# Patient Record
Sex: MALE | Race: WHITE | Employment: FULL TIME | URBAN - METROPOLITAN AREA
[De-identification: names, ages, dates, MRNs, and addresses within clinical notes are randomized per-mention and may not be internally consistent; named-entity substitution may affect disease eponyms.]

---

## 2021-08-20 LAB
A/G RATIO: 1.4 (ref 1.1–2.2)
ALBUMIN SERPL-MCNC: 3.9 G/DL (ref 3.4–5)
ALP BLD-CCNC: 70 U/L (ref 40–129)
ALT SERPL-CCNC: 24 U/L (ref 10–40)
ANION GAP SERPL CALCULATED.3IONS-SCNC: 7 MMOL/L (ref 3–16)
AST SERPL-CCNC: 17 U/L (ref 15–37)
BASOPHILS ABSOLUTE: 0 K/UL (ref 0–0.2)
BASOPHILS RELATIVE PERCENT: 0.5 %
BILIRUB SERPL-MCNC: 0.5 MG/DL (ref 0–1)
BUN BLDV-MCNC: 16 MG/DL (ref 7–20)
CALCIUM SERPL-MCNC: 9.5 MG/DL (ref 8.3–10.6)
CHLORIDE BLD-SCNC: 104 MMOL/L (ref 99–110)
CO2: 28 MMOL/L (ref 21–32)
CREAT SERPL-MCNC: 1.1 MG/DL (ref 0.9–1.3)
EOSINOPHILS ABSOLUTE: 0.2 K/UL (ref 0–0.6)
EOSINOPHILS RELATIVE PERCENT: 2 %
GFR AFRICAN AMERICAN: >60
GFR NON-AFRICAN AMERICAN: >60
GLOBULIN: 2.7 G/DL
GLUCOSE BLD-MCNC: 103 MG/DL (ref 70–99)
HCT VFR BLD CALC: 51.1 % (ref 40.5–52.5)
HEMOGLOBIN: 17 G/DL (ref 13.5–17.5)
LYMPHOCYTES ABSOLUTE: 2.2 K/UL (ref 1–5.1)
LYMPHOCYTES RELATIVE PERCENT: 22.9 %
MCH RBC QN AUTO: 31.6 PG (ref 26–34)
MCHC RBC AUTO-ENTMCNC: 33.3 G/DL (ref 31–36)
MCV RBC AUTO: 95 FL (ref 80–100)
MONOCYTES ABSOLUTE: 0.8 K/UL (ref 0–1.3)
MONOCYTES RELATIVE PERCENT: 8 %
NEUTROPHILS ABSOLUTE: 6.5 K/UL (ref 1.7–7.7)
NEUTROPHILS RELATIVE PERCENT: 66.6 %
PDW BLD-RTO: 15 % (ref 12.4–15.4)
PLATELET # BLD: 215 K/UL (ref 135–450)
PMV BLD AUTO: 8.4 FL (ref 5–10.5)
POTASSIUM SERPL-SCNC: 4.8 MMOL/L (ref 3.5–5.1)
RBC # BLD: 5.39 M/UL (ref 4.2–5.9)
SODIUM BLD-SCNC: 139 MMOL/L (ref 136–145)
TOTAL PROTEIN: 6.6 G/DL (ref 6.4–8.2)
WBC # BLD: 9.8 K/UL (ref 4–11)

## 2021-09-20 ENCOUNTER — ANESTHESIA EVENT (OUTPATIENT)
Dept: ENDOSCOPY | Age: 56
End: 2021-09-20
Payer: COMMERCIAL

## 2021-09-20 RX ORDER — HYDROCHLOROTHIAZIDE 25 MG/1
25 TABLET ORAL EVERY MORNING
Status: ON HOLD | COMMUNITY
Start: 2020-11-25 | End: 2021-09-21

## 2021-09-20 RX ORDER — OMEPRAZOLE 20 MG/1
20 CAPSULE, DELAYED RELEASE ORAL DAILY
COMMUNITY

## 2021-09-20 ASSESSMENT — LIFESTYLE VARIABLES: SMOKING_STATUS: 1

## 2021-09-20 NOTE — ANESTHESIA PRE PROCEDURE
Department of Anesthesiology  Preprocedure Note       Name:  Jaciel Yuen   Age:  50 y.o.  :  1965                                          MRN:  [id number]         Date:  2021      Surgeon: Dewayne Jimenez):  Tiana Glover MD    Procedure: Procedure(s):  ESOPHAGOGASTRODUODENOSCOPY  COLONOSCOPY    Medications prior to admission:   Prior to Admission medications    Medication Sig Start Date End Date Taking? Authorizing Provider   omeprazole (PRILOSEC) 20 MG delayed release capsule Take 20 mg by mouth daily   Yes Historical Provider, MD       Current medications:    Current Facility-Administered Medications   Medication Dose Route Frequency Provider Last Rate Last Admin    0.9 % sodium chloride infusion   IntraVENous Continuous Deejay Montanez MD 75 mL/hr at 21 0758 New Bag at 21 0758    sodium chloride flush 0.9 % injection 5-40 mL  5-40 mL IntraVENous 2 times per day Deejay Montanez MD        sodium chloride flush 0.9 % injection 5-40 mL  5-40 mL IntraVENous PRN Deejay Montanez MD        0.9 % sodium chloride infusion  25 mL IntraVENous PRN Deejay Montanez MD           Allergies:  No Known Allergies    Problem List:  There is no problem list on file for this patient.       Past Medical History:        Diagnosis Date    Blood in stool     Diarrhea     DVT (deep venous thrombosis) (HCC)     Edema leg     bilateral    Fatigue     Hypertension        Past Surgical History:        Procedure Laterality Date    ELBOW SURGERY      right       Social History:    Social History     Tobacco Use    Smoking status: Current Every Day Smoker     Packs/day: 2.00     Years: 25.00     Pack years: 50.00     Types: Cigarettes    Smokeless tobacco: Never Used   Substance Use Topics    Alcohol use: Not Currently                                Ready to quit: Not Answered  Counseling given: Not Answered      Vital Signs (Current):   Vitals:    21 9354 21 3920 21 8608 BP:   (!) 149/89   Pulse:   71   Resp:   18   Temp:   97 °F (36.1 °C)   TempSrc:   Temporal   SpO2:   94%   Weight: (!) 349 lb (158.3 kg) (!) 341 lb 14.9 oz (155.1 kg)    Height: 6' (1.829 m)                                                BP Readings from Last 3 Encounters:   09/21/21 (!) 149/89       NPO Status: Time of last liquid consumption: 0130                        Time of last solid consumption: 1000                        Date of last liquid consumption: 09/21/21                        Date of last solid food consumption: 09/20/21    BMI:   Wt Readings from Last 3 Encounters:   09/21/21 (!) 341 lb 14.9 oz (155.1 kg)     Body mass index is 46.37 kg/m². CBC:   Lab Results   Component Value Date    WBC 9.8 08/20/2021    RBC 5.39 08/20/2021    HGB 17.0 08/20/2021    HCT 51.1 08/20/2021    MCV 95.0 08/20/2021    RDW 15.0 08/20/2021     08/20/2021       CMP:   Lab Results   Component Value Date     08/20/2021    K 4.8 08/20/2021     08/20/2021    CO2 28 08/20/2021    BUN 16 08/20/2021    CREATININE 1.1 08/20/2021    GFRAA >60 08/20/2021    AGRATIO 1.4 08/20/2021    LABGLOM >60 08/20/2021    GLUCOSE 103 08/20/2021    PROT 6.6 08/20/2021    CALCIUM 9.5 08/20/2021    BILITOT 0.5 08/20/2021    ALKPHOS 70 08/20/2021    AST 17 08/20/2021    ALT 24 08/20/2021       POC Tests: No results for input(s): POCGLU, POCNA, POCK, POCCL, POCBUN, POCHEMO, POCHCT in the last 72 hours.     Coags: No results found for: PROTIME, INR, APTT    HCG (If Applicable): No results found for: PREGTESTUR, PREGSERUM, HCG, HCGQUANT     ABGs: No results found for: PHART, PO2ART, CBU9DMZ, MVG8FRG, BEART, T6TBNBPS     Type & Screen (If Applicable):  No results found for: LABABO, LABRH    Drug/Infectious Status (If Applicable):  No results found for: HIV, HEPCAB    COVID-19 Screening (If Applicable): No results found for: COVID19        Anesthesia Evaluation   no history of anesthetic complications:   Airway: Mallampati: II  TM distance: >3 FB   Neck ROM: full  Mouth opening: > = 3 FB Dental:      Comment: No loose teeth    Pulmonary:   (+) decreased breath sounds,  asthma: current smoker    (-) COPD, rhonchi, wheezes and rales  Sleep apnea: patient denies. Cardiovascular:  Exercise tolerance: good (>4 METS),   (+) hypertension (no longer taking HCTZ):,     (-) murmur, weak pulses and no hyperlipidemia      Rhythm: regular  Rate: normal                 ROS comment: Echocardiogram:   Summary:    Overall left ventricular ejection fraction is estimated to be 60-65%.   Left ventricular systolic function is normal. (CRGJ>/=04%)  Normal Diastolic Function.       Neuro/Psych:      (-) seizures, TIA and CVA           GI/Hepatic/Renal:   (+) GERD:, bowel prep, morbid obesity (BMI: 47.3)          Endo/Other:                      ROS comment: + h/o DVT   Abdominal:   (+) obese (morbid),     Abdomen: soft. Vascular: Other Findings:           Anesthesia Plan      MAC     ASA 3     (POM )  Induction: intravenous. Anesthetic plan and risks discussed with spouse and patient. Use of blood products discussed with patient whom. Plan discussed with CRNA. This pre-anesthesia assessment may be used as a history and physical.    DOS STAFF ADDENDUM:    Pt seen and examined, chart reviewed (including anesthesia, drug and allergy history). No interval changes to history and physical examination. Anesthetic plan, risks, benefits, alternatives, and personnel involved discussed with patient. Patient verbalized an understanding and agrees to proceed.       Joelle Abraham MD  September 21, 2021  7:59 AM

## 2021-09-21 ENCOUNTER — ANESTHESIA (OUTPATIENT)
Dept: ENDOSCOPY | Age: 56
End: 2021-09-21
Payer: COMMERCIAL

## 2021-09-21 ENCOUNTER — HOSPITAL ENCOUNTER (OUTPATIENT)
Age: 56
Setting detail: OUTPATIENT SURGERY
Discharge: HOME HEALTH | End: 2021-09-21
Attending: INTERNAL MEDICINE | Admitting: INTERNAL MEDICINE
Payer: COMMERCIAL

## 2021-09-21 VITALS
WEIGHT: 315 LBS | DIASTOLIC BLOOD PRESSURE: 76 MMHG | OXYGEN SATURATION: 90 % | BODY MASS INDEX: 42.66 KG/M2 | SYSTOLIC BLOOD PRESSURE: 124 MMHG | RESPIRATION RATE: 15 BRPM | HEART RATE: 89 BPM | HEIGHT: 72 IN | TEMPERATURE: 97 F

## 2021-09-21 VITALS
SYSTOLIC BLOOD PRESSURE: 221 MMHG | OXYGEN SATURATION: 98 % | RESPIRATION RATE: 17 BRPM | DIASTOLIC BLOOD PRESSURE: 105 MMHG

## 2021-09-21 DIAGNOSIS — R19.7 DIARRHEA, UNSPECIFIED TYPE: ICD-10-CM

## 2021-09-21 LAB — SARS-COV-2, NAAT: NOT DETECTED

## 2021-09-21 PROCEDURE — 2709999900 HC NON-CHARGEABLE SUPPLY: Performed by: INTERNAL MEDICINE

## 2021-09-21 PROCEDURE — 3609010300 HC COLONOSCOPY W/BIOPSY SINGLE/MULTIPLE: Performed by: INTERNAL MEDICINE

## 2021-09-21 PROCEDURE — 7100000010 HC PHASE II RECOVERY - FIRST 15 MIN: Performed by: INTERNAL MEDICINE

## 2021-09-21 PROCEDURE — 94640 AIRWAY INHALATION TREATMENT: CPT

## 2021-09-21 PROCEDURE — 3609012400 HC EGD TRANSORAL BIOPSY SINGLE/MULTIPLE: Performed by: INTERNAL MEDICINE

## 2021-09-21 PROCEDURE — 7100000000 HC PACU RECOVERY - FIRST 15 MIN: Performed by: INTERNAL MEDICINE

## 2021-09-21 PROCEDURE — 2720000010 HC SURG SUPPLY STERILE: Performed by: INTERNAL MEDICINE

## 2021-09-21 PROCEDURE — 3609013200 HC EGD W/ ABLATION: Performed by: INTERNAL MEDICINE

## 2021-09-21 PROCEDURE — 3609010200 HC COLONOSCOPY ABLATION TUMOR POLYP/OTHER LES: Performed by: INTERNAL MEDICINE

## 2021-09-21 PROCEDURE — 6370000000 HC RX 637 (ALT 250 FOR IP): Performed by: STUDENT IN AN ORGANIZED HEALTH CARE EDUCATION/TRAINING PROGRAM

## 2021-09-21 PROCEDURE — 87635 SARS-COV-2 COVID-19 AMP PRB: CPT

## 2021-09-21 PROCEDURE — 2580000003 HC RX 258: Performed by: ANESTHESIOLOGY

## 2021-09-21 PROCEDURE — 94761 N-INVAS EAR/PLS OXIMETRY MLT: CPT

## 2021-09-21 PROCEDURE — 6370000000 HC RX 637 (ALT 250 FOR IP): Performed by: INTERNAL MEDICINE

## 2021-09-21 PROCEDURE — 6360000002 HC RX W HCPCS: Performed by: NURSE ANESTHETIST, CERTIFIED REGISTERED

## 2021-09-21 PROCEDURE — 7100000001 HC PACU RECOVERY - ADDTL 15 MIN: Performed by: INTERNAL MEDICINE

## 2021-09-21 PROCEDURE — 88305 TISSUE EXAM BY PATHOLOGIST: CPT

## 2021-09-21 PROCEDURE — 2700000000 HC OXYGEN THERAPY PER DAY

## 2021-09-21 PROCEDURE — 3700000001 HC ADD 15 MINUTES (ANESTHESIA): Performed by: INTERNAL MEDICINE

## 2021-09-21 PROCEDURE — 2500000003 HC RX 250 WO HCPCS: Performed by: NURSE ANESTHETIST, CERTIFIED REGISTERED

## 2021-09-21 PROCEDURE — 3700000000 HC ANESTHESIA ATTENDED CARE: Performed by: INTERNAL MEDICINE

## 2021-09-21 RX ORDER — ALBUTEROL SULFATE 90 UG/1
AEROSOL, METERED RESPIRATORY (INHALATION) PRN
Status: DISCONTINUED | OUTPATIENT
Start: 2021-09-21 | End: 2021-09-21 | Stop reason: SDUPTHER

## 2021-09-21 RX ORDER — IPRATROPIUM BROMIDE AND ALBUTEROL SULFATE 2.5; .5 MG/3ML; MG/3ML
1 SOLUTION RESPIRATORY (INHALATION) ONCE
Status: COMPLETED | OUTPATIENT
Start: 2021-09-21 | End: 2021-09-21

## 2021-09-21 RX ORDER — HYDRALAZINE HYDROCHLORIDE 20 MG/ML
5 INJECTION INTRAMUSCULAR; INTRAVENOUS EVERY 10 MIN PRN
Status: DISCONTINUED | OUTPATIENT
Start: 2021-09-21 | End: 2021-09-21 | Stop reason: HOSPADM

## 2021-09-21 RX ORDER — SUCCINYLCHOLINE/SOD CL,ISO/PF 200MG/10ML
SYRINGE (ML) INTRAVENOUS PRN
Status: DISCONTINUED | OUTPATIENT
Start: 2021-09-21 | End: 2021-09-21 | Stop reason: SDUPTHER

## 2021-09-21 RX ORDER — SODIUM CHLORIDE 0.9 % (FLUSH) 0.9 %
5-40 SYRINGE (ML) INJECTION PRN
Status: DISCONTINUED | OUTPATIENT
Start: 2021-09-21 | End: 2021-09-21 | Stop reason: HOSPADM

## 2021-09-21 RX ORDER — SODIUM CHLORIDE 9 MG/ML
INJECTION, SOLUTION INTRAVENOUS CONTINUOUS
Status: DISCONTINUED | OUTPATIENT
Start: 2021-09-21 | End: 2021-09-21 | Stop reason: HOSPADM

## 2021-09-21 RX ORDER — LIDOCAINE HYDROCHLORIDE 20 MG/ML
INJECTION, SOLUTION EPIDURAL; INFILTRATION; INTRACAUDAL; PERINEURAL PRN
Status: DISCONTINUED | OUTPATIENT
Start: 2021-09-21 | End: 2021-09-21 | Stop reason: SDUPTHER

## 2021-09-21 RX ORDER — SODIUM CHLORIDE 0.9 % (FLUSH) 0.9 %
5-40 SYRINGE (ML) INJECTION EVERY 12 HOURS SCHEDULED
Status: DISCONTINUED | OUTPATIENT
Start: 2021-09-21 | End: 2021-09-21 | Stop reason: HOSPADM

## 2021-09-21 RX ORDER — PROPOFOL 10 MG/ML
INJECTION, EMULSION INTRAVENOUS CONTINUOUS PRN
Status: DISCONTINUED | OUTPATIENT
Start: 2021-09-21 | End: 2021-09-21 | Stop reason: SDUPTHER

## 2021-09-21 RX ORDER — LABETALOL HYDROCHLORIDE 5 MG/ML
5 INJECTION, SOLUTION INTRAVENOUS EVERY 10 MIN PRN
Status: DISCONTINUED | OUTPATIENT
Start: 2021-09-21 | End: 2021-09-21 | Stop reason: HOSPADM

## 2021-09-21 RX ORDER — SODIUM CHLORIDE 9 MG/ML
25 INJECTION, SOLUTION INTRAVENOUS PRN
Status: DISCONTINUED | OUTPATIENT
Start: 2021-09-21 | End: 2021-09-21 | Stop reason: HOSPADM

## 2021-09-21 RX ORDER — PROPOFOL 10 MG/ML
INJECTION, EMULSION INTRAVENOUS PRN
Status: DISCONTINUED | OUTPATIENT
Start: 2021-09-21 | End: 2021-09-21 | Stop reason: SDUPTHER

## 2021-09-21 RX ORDER — ONDANSETRON 2 MG/ML
4 INJECTION INTRAMUSCULAR; INTRAVENOUS
Status: DISCONTINUED | OUTPATIENT
Start: 2021-09-21 | End: 2021-09-21 | Stop reason: HOSPADM

## 2021-09-21 RX ADMIN — Medication 6 PUFF: at 10:41

## 2021-09-21 RX ADMIN — PROPOFOL 200 MCG/KG/MIN: 10 INJECTION, EMULSION INTRAVENOUS at 09:31

## 2021-09-21 RX ADMIN — IPRATROPIUM BROMIDE AND ALBUTEROL SULFATE 1 AMPULE: 2.5; .5 SOLUTION RESPIRATORY (INHALATION) at 10:54

## 2021-09-21 RX ADMIN — Medication 200 MG: at 09:45

## 2021-09-21 RX ADMIN — SODIUM CHLORIDE: 9 INJECTION, SOLUTION INTRAVENOUS at 07:58

## 2021-09-21 RX ADMIN — LIDOCAINE HYDROCHLORIDE 100 MG: 20 INJECTION, SOLUTION EPIDURAL; INFILTRATION; INTRACAUDAL; PERINEURAL at 09:31

## 2021-09-21 RX ADMIN — PROPOFOL 200 MG: 10 INJECTION, EMULSION INTRAVENOUS at 09:45

## 2021-09-21 RX ADMIN — Medication 3 PUFF: at 08:07

## 2021-09-21 RX ADMIN — PROPOFOL 100 MG: 10 INJECTION, EMULSION INTRAVENOUS at 09:31

## 2021-09-21 ASSESSMENT — PULMONARY FUNCTION TESTS
PIF_VALUE: 1
PIF_VALUE: 27
PIF_VALUE: 1
PIF_VALUE: 17
PIF_VALUE: 36
PIF_VALUE: 0
PIF_VALUE: 26
PIF_VALUE: 29
PIF_VALUE: 21
PIF_VALUE: 22
PIF_VALUE: 0
PIF_VALUE: 0
PIF_VALUE: 28
PIF_VALUE: 1
PIF_VALUE: 30
PIF_VALUE: 27
PIF_VALUE: 41
PIF_VALUE: 0
PIF_VALUE: 29
PIF_VALUE: 2
PIF_VALUE: 32
PIF_VALUE: 8
PIF_VALUE: 25
PIF_VALUE: 1
PIF_VALUE: 0
PIF_VALUE: 21
PIF_VALUE: 44
PIF_VALUE: 18
PIF_VALUE: 1
PIF_VALUE: 17
PIF_VALUE: 27
PIF_VALUE: 27
PIF_VALUE: 1
PIF_VALUE: 39
PIF_VALUE: 47
PIF_VALUE: 32
PIF_VALUE: 0
PIF_VALUE: 35
PIF_VALUE: 3
PIF_VALUE: 40
PIF_VALUE: 33
PIF_VALUE: 31
PIF_VALUE: 24
PIF_VALUE: 21
PIF_VALUE: 21
PIF_VALUE: 26
PIF_VALUE: 21
PIF_VALUE: 27
PIF_VALUE: 21
PIF_VALUE: 0
PIF_VALUE: 29
PIF_VALUE: 27
PIF_VALUE: 12
PIF_VALUE: 44
PIF_VALUE: 12
PIF_VALUE: 27
PIF_VALUE: 17
PIF_VALUE: 21
PIF_VALUE: 27
PIF_VALUE: 29
PIF_VALUE: 1
PIF_VALUE: 1
PIF_VALUE: 34
PIF_VALUE: 19
PIF_VALUE: 1
PIF_VALUE: 24
PIF_VALUE: 29
PIF_VALUE: 1
PIF_VALUE: 21
PIF_VALUE: 29
PIF_VALUE: 27
PIF_VALUE: 31
PIF_VALUE: 29
PIF_VALUE: 27
PIF_VALUE: 3
PIF_VALUE: 21
PIF_VALUE: 28
PIF_VALUE: 1
PIF_VALUE: 25
PIF_VALUE: 12
PIF_VALUE: 27

## 2021-09-21 ASSESSMENT — PAIN - FUNCTIONAL ASSESSMENT: PAIN_FUNCTIONAL_ASSESSMENT: 0-10

## 2021-09-21 NOTE — PROGRESS NOTES
Arrives to PACU, b/p elevated, tachypnic, Dr. Dinah Swanson at bedside to evaluate patient, respiratory at bedside for stat duoneb.

## 2021-09-21 NOTE — ANESTHESIA POSTPROCEDURE EVALUATION
Department of Anesthesiology  Postprocedure Note    Patient: Omkar Banda  MRN: [id number]  YOB: 1965  Date of evaluation: 9/21/2021  Time:  3:05 PM     Procedure Summary     Date: 09/21/21 Room / Location: [address] [place] [address]    Anesthesia Start: 2095 Anesthesia Stop: 1050    Procedures:       EGD BIOPSY (N/A )      COLONOSCOPY WITH BIOPSY (N/A )      EGD ABLATION with apc      COLONOSCOPY POLYPECTOMY ABLATION and cold snare Diagnosis:       Diarrhea, unspecified type      (DIARRHEA)    Surgeons: Radha Serrano MD Responsible Provider: Edgar Johnson MD    Anesthesia Type: MAC ASA Status: 3          Anesthesia Type: MAC    Katlin Phase I: Katlin Score: 10    Katlin Phase II: Katlin Score: 10    Last vitals: Reviewed and per EMR flowsheets. Anesthesia Post Evaluation    Patient location during evaluation: PACU  Patient participation: waiting for patient participation  Level of consciousness: sleepy but conscious  Airway patency: BREA   Nausea & Vomiting: no nausea and no vomiting  Complications: no  Cardiovascular status: hemodynamically stable and blood pressure returned to baseline  Respiratory status: nasal airway, face mask, spontaneous ventilation and airway suctioned  Hydration status: stable  Comments:   Patient desaturated at beginning of EGD, likely 2/2 bronchospasm with endoscope advancement. Bag masked and allowed to emerge from sedation. Saturations recovered appropriately. After discussion with Dr. Ailyn Pandey, procedure unable to be delayed at this time, elected to complete procedure under general. Patient intubated with video laryngoscope without difficulty. Slow to emerge from general anesthesia, EtCO2 elevated, copious nasal discharge prior to extubation. ETT and oropharynx thoroughly suctioned. Unrecognized URI likely contributory to bronchospasm. Extubated to non-rebreather mask. Upon arrival to PACU, Mr. Missy Crews saturating well.  Intermittent chin lift initially required to resolve obstructive breathing. Nasal airway removed as patient more alert. Later seen resting comfortably, saturating well on room air. Denies any pain or nausea.       Barney Montiel MD

## 2021-09-21 NOTE — OP NOTE
Endoscopy Note    Patient: Patria Evans  : 1965  Acct#:     Procedure: Esophagogastroduodenoscopy with biopsy, argon plasma coagulation                       Colonoscopy with biopsy, polypectomy (cold snare), polypectomy (snare cautery)    Date:  2021     Surgeon:   Saurabh Velasco MD    Referring Physician:  Sarah Beth Garza    Indications: This is a 50 y.o. male  who presents for EGD and colonoscopy due to chronic diarrhea and episodic melena. Postoperative Diagnosis:    1. LA Grade B esophagitis with three tongues of salmon colored mucosa, extending 2 cm proximal from the proximal edge of the gastric folds 37 cm from the incisors. The distal esophagus was examined with white light and NBI, and targeted biopsies were obtained. 2. 3 cm sliding hiatal hernia extending from 37 to 40 cm from the incisors  3. 3 mm gastric body AVM, obliterated with APC  4. Patchy antral erythema, without ulceration or erosion. Biopsies were obtained from the gastric antrum, incisura and body to evaluate for H. Pylori. 5. Patchy duodenal erythema. Biopsies obtained from the duodenal bulb and the second portion of the duodenum to evaluate for Celiac disease. 6. 3 mm non-bleeding AVM in the proximal duodenal bulb overlying Brunner's glan hyperplasia, which was obliterated with APC. 7. 3 mm transverse colon polyp, resected with cold snare  8. 4 mm descending colon polyp, resected with cold snare  9. Three rectal polyps, two 3 mm resected with cold snare, and a 7 mm distal rectal polyp resected with snare cautery. 10. Left sided diverticular disease, with danielle-diverticular erythema. Otherwise normal colonic mucosa throughout the entire colon. Random biopsies were obtained from the cecum, right colon, left colon and rectosigmoid for evaluation of microscopic colitis.      Anesthesia:  MAC, converted to general anesthesia with endotracheal intubation due to transient hypoxia and obstruction during EGD    Consent:  The patient or their legal guardian has signed a consent, and is aware of the potential risks, benefits, alternatives, and potential complications of this procedure. These include, but are not limited to hemorrhage, bleeding, post procedural pain, perforation, phlebitis, aspiration, hypotension, hypoxia, cardiovascular events such as arryhthmia, and possibly death. Additionally, the possibility of missed colonic polyps and interval colon cancer was discussed in the consent. Description of Procedure: The patient was then taken to the endoscopy suite, placed in the left lateral decubitus position and the above IV sedation was administrered. The Olympus video endoscope was placed through the patient's oropharynx without difficulty to the extent of the 2nd portion of the duodenum. Both forward and retroflexed views of the stomach were obtained. Findings:    Esophagus: LA Grade B esophagitis with three tongues of salmon colored mucosa, extending 2 cm proximal from the proximal edge of the gastric folds 37 cm from the incisors. The distal esophagus was examined with white light and NBI, and targeted biopsies were obtained. Stomach: The stomach was examined on forward and retroflexed views. A 3 cm small sliding hiatal hernia was present without erythema or erosion. A 3 mm gastric body AVM was seen and obliterated with APC. Patchy antral erythema was present without ulceration or erosion. Biopsies were obtained from the gastric antrum, incisura and body to evaluate for H. Pylori. Duodenum: The first and 2nd portions of the duodenum were examined and notable for patchy duodenal erythema. Biopsies obtained from the duodenal bulb and the second portion of the duodenum to evaluate for Celiac disease. A 3 mm non-bleeding AVM in the proximal duodenal bulb overlying Brunner's glan hyperplasia, which was obliterated with APC.     The scope was then withdrawn back into the stomach, it was decompressed, and the scope was completely withdrawn. A digital rectal examination was performed and revealed negative without mass, lesions or tenderness. The Olympus video colonoscope was placed in the patient's rectum under digital direction and advanced to the cecum. The cecum was identified by characteristic anatomy and ballottment. The prep was good. The ileocecal valve was identified. The terminal ileum was no able to be evaluated. The scope was then withdrawn back through the cecum, ascending, transverse, descending, sigmoid colon, and rectum. Careful circumferential examination of the mucosa in these areas demonstrated:    - 3 mm non-bleeding AVM in the proximal duodenal bulb overlying Brunner's glan hyperplasia, which was obliterated with APC. - 3 mm transverse colon polyp, resected with cold snare  - 4 mm descending colon polyp, resected with cold snare  - Three rectal polyps, two 3 mm resected with cold snare, and a 7 mm distal rectal polyp resected with snare cautery. - Left sided diverticular disease, with danielle-diverticular erythema. Otherwise normal colonic mucosa throughout the entire colon. Random biopsies were obtained from the cecum, right colon, left colon and rectosigmoid for evaluation of microscopic colitis. - Multiple benign appearing hyperplastic rectosigmoid polyps not resected. The scope was then withdrawn into the rectum and retroflexed. The retroflexed view of the anal verge and rectum demonstrates internal hemorrhoids. The scope was straightened, the colon was decompressed and the scope was withdrawn from the patient. The patient tolerated the procedure well and was taken to the PACU in good condition. Estimated Blood Loss: Minimal     Impression:    1) See post procedure diagnoses    Recommendations:   - Follow-up pathology results in 7 days, by calling the office at [phone].  - Resume regular medications, including omeprazole.   - Resume diet as tolerated. - Repeat colonoscopy in 3 years. Repeat EGD based upon pathology. - Return to clinic following results of biopsies for further evaluation.     MANDI Christensen [address]

## 2021-09-21 NOTE — ANESTHESIA PROCEDURE NOTES
Airway  Date/Time: 9/21/2021 9:41 AM  Urgency: elective    Airway not difficult    General Information and Staff    Patient location during procedure: OR  Anesthesiologist: Alise Nunez MD  Performed: anesthesiologist     Indications and Patient Condition  Indications for airway management: anesthesia and airway protection  Spontaneous Ventilation: absent  Sedation level: deep  Preoxygenated: yes  Patient position: sniffing  MILS not maintained throughout  Mask difficulty assessment: not attempted    Final Airway Details  Final airway type: endotracheal airway      Successful airway: ETT  Cuffed: yes   Successful intubation technique: video laryngoscopy  Facilitating devices/methods: intubating stylet and cricoid pressure (Cricoid pressure removed to improve view)  Endotracheal tube insertion site: oral  Blade: Vivi  Blade size: #3  ETT size (mm): 8.0  Cormack-Lehane Classification: grade I - full view of glottis  Placement verified by: chest auscultation and capnometry   Measured from: teeth  ETT to teeth (cm): 23  Number of attempts at approach: 1    Additional Comments  Unable to tolerated endoscopy under MAC, obstructive breathing with reactive airway. Elective video laryngoscopy. Atraumatic intubation. Dentition at baseline.

## 2021-09-21 NOTE — PROGRESS NOTES
Pt alert and oriented, denies pain, vitals stable on room air. Pt doesn't want snacks, wants to go home. Pt to side of bed, IV removed. Discharge instructions given with wife at bedside. Pt refused to be wheeled out or wait for wife to pull car around.

## 2021-09-21 NOTE — H&P
Pre-operative History and Physical    Patient: Enriqueta Kayser  : 1965  Acct#:     Intended Procedure:  EGD and colonoscopy    HISTORY OF PRESENT ILLNESS:  The patient is a 50 y.o. male  who presents for EGD and colonoscopy due to chronic diarrhea and episodic melena. Past Medical History:        Diagnosis Date    Blood in stool     Diarrhea     DVT (deep venous thrombosis) (HCC)     Edema leg     bilateral    Fatigue     Hypertension      Past Surgical History:        Procedure Laterality Date    ELBOW SURGERY      right     Medications Prior to Admission:   No current facility-administered medications on file prior to encounter. Current Outpatient Medications on File Prior to Encounter   Medication Sig Dispense Refill    omeprazole (PRILOSEC) 20 MG delayed release capsule Take 20 mg by mouth daily          Allergies:  Patient has no known allergies. Social History:   TOBACCO:   reports that he has been smoking cigarettes. He has a 50.00 pack-year smoking history. He has never used smokeless tobacco.  ETOH:   reports previous alcohol use. DRUGS:   reports no history of drug use. PHYSICAL EXAM:      Vital Signs: BP (!) 149/89   Pulse 71   Temp 97 °F (36.1 °C) (Temporal)   Resp 18   Ht 6' (1.829 m)   Wt (!) 341 lb 14.9 oz (155.1 kg)   SpO2 94%   BMI 46.37 kg/m²    Airway: No stridor or wheezing noted. Good air movement  Pulmonary: without wheezes.   Clear to auscultation  Cardiac:regular rate and rhythm without loud murmurs  Abdomen:soft, nontender,  Bowel sounds present    Pre-Procedure Assessment / Plan:  1) EGD and colonoscopy     ASA Grade:  ASA 3 - Patient with moderate systemic disease with functional limitations  Mallampati Classification:  Class III    Level of Sedation Plan:MAC    Post Procedure plan: Return to same level of care    I assessed the patient and find that the patient is in satisfactory condition to proceed with the planned procedure and sedation plan.    I have explained the risk, benefits, and alternatives to the procedure; the patient understands and agrees to proceed.        Medardo Woodall MD  9/21/2021

## 2021-12-16 ENCOUNTER — HOSPITAL ENCOUNTER (EMERGENCY)
Age: 56
Discharge: HOME | End: 2021-12-16
Attending: EMERGENCY MEDICINE
Payer: COMMERCIAL

## 2021-12-16 VITALS
DIASTOLIC BLOOD PRESSURE: 96 MMHG | BODY MASS INDEX: 46.05 KG/M2 | WEIGHT: 315 LBS | HEART RATE: 72 BPM | TEMPERATURE: 98.5 F | SYSTOLIC BLOOD PRESSURE: 147 MMHG | RESPIRATION RATE: 17 BRPM | OXYGEN SATURATION: 94 %

## 2021-12-16 DIAGNOSIS — I82.812 SUPERFICIAL THROMBOSIS OF LEFT LOWER EXTREMITY: Primary | ICD-10-CM

## 2021-12-16 LAB
A/G RATIO: 1 (ref 1.1–2.2)
ALBUMIN SERPL-MCNC: 3.4 G/DL (ref 3.4–5)
ALP BLD-CCNC: 74 U/L (ref 40–129)
ALT SERPL-CCNC: 20 U/L (ref 10–40)
ANION GAP SERPL CALCULATED.3IONS-SCNC: 13 MMOL/L (ref 3–16)
AST SERPL-CCNC: 15 U/L (ref 15–37)
BASOPHILS ABSOLUTE: 0.1 K/UL (ref 0–0.2)
BASOPHILS RELATIVE PERCENT: 0.6 %
BILIRUB SERPL-MCNC: 0.4 MG/DL (ref 0–1)
BUN BLDV-MCNC: 14 MG/DL (ref 7–20)
CALCIUM SERPL-MCNC: 8.7 MG/DL (ref 8.3–10.6)
CHLORIDE BLD-SCNC: 102 MMOL/L (ref 99–110)
CO2: 25 MMOL/L (ref 21–32)
CREAT SERPL-MCNC: 0.8 MG/DL (ref 0.9–1.3)
EOSINOPHILS ABSOLUTE: 0.3 K/UL (ref 0–0.6)
EOSINOPHILS RELATIVE PERCENT: 3.6 %
GFR AFRICAN AMERICAN: >60
GFR NON-AFRICAN AMERICAN: >60
GLUCOSE BLD-MCNC: 111 MG/DL (ref 70–99)
HCT VFR BLD CALC: 49.8 % (ref 40.5–52.5)
HEMOGLOBIN: 16.7 G/DL (ref 13.5–17.5)
INR BLD: 1.03 (ref 0.88–1.12)
LYMPHOCYTES ABSOLUTE: 1.8 K/UL (ref 1–5.1)
LYMPHOCYTES RELATIVE PERCENT: 21.4 %
MCH RBC QN AUTO: 31.5 PG (ref 26–34)
MCHC RBC AUTO-ENTMCNC: 33.6 G/DL (ref 31–36)
MCV RBC AUTO: 93.7 FL (ref 80–100)
MONOCYTES ABSOLUTE: 0.7 K/UL (ref 0–1.3)
MONOCYTES RELATIVE PERCENT: 8 %
NEUTROPHILS ABSOLUTE: 5.6 K/UL (ref 1.7–7.7)
NEUTROPHILS RELATIVE PERCENT: 66.4 %
PDW BLD-RTO: 16.1 % (ref 12.4–15.4)
PLATELET # BLD: 181 K/UL (ref 135–450)
PMV BLD AUTO: 7.5 FL (ref 5–10.5)
POTASSIUM SERPL-SCNC: 4 MMOL/L (ref 3.5–5.1)
PROTHROMBIN TIME: 11.7 SEC (ref 9.9–12.7)
RBC # BLD: 5.31 M/UL (ref 4.2–5.9)
SODIUM BLD-SCNC: 140 MMOL/L (ref 136–145)
TOTAL PROTEIN: 6.7 G/DL (ref 6.4–8.2)
WBC # BLD: 8.5 K/UL (ref 4–11)

## 2021-12-16 PROCEDURE — 99284 EMERGENCY DEPT VISIT MOD MDM: CPT

## 2021-12-16 PROCEDURE — 85610 PROTHROMBIN TIME: CPT

## 2021-12-16 PROCEDURE — 85025 COMPLETE CBC W/AUTO DIFF WBC: CPT

## 2021-12-16 PROCEDURE — 93971 EXTREMITY STUDY: CPT

## 2021-12-16 PROCEDURE — 80053 COMPREHEN METABOLIC PANEL: CPT

## 2021-12-16 ASSESSMENT — ENCOUNTER SYMPTOMS
COUGH: 0
DIARRHEA: 0
VOMITING: 0
NAUSEA: 0
ABDOMINAL PAIN: 0
EYES NEGATIVE: 1
SHORTNESS OF BREATH: 0
CHEST TIGHTNESS: 0
SORE THROAT: 0

## 2021-12-16 ASSESSMENT — PAIN DESCRIPTION - ORIENTATION: ORIENTATION: LEFT

## 2021-12-16 ASSESSMENT — PAIN SCALES - GENERAL: PAINLEVEL_OUTOF10: 4

## 2021-12-16 ASSESSMENT — PAIN DESCRIPTION - PAIN TYPE: TYPE: ACUTE PAIN

## 2021-12-16 ASSESSMENT — PAIN DESCRIPTION - LOCATION: LOCATION: LEG

## 2021-12-16 NOTE — ED NOTES
Patient left without being registered, MD did educate patient on follow up care and reasons to seek medical attention. No sxs of distress noted at time of departure.       Celia Proctor RN  12/16/21 [id number]

## 2021-12-16 NOTE — ED PROVIDER NOTES
[facility]  [facility]      Pt Name: Magen Carrera  MRN: [id number]  [place] 1965  Date ofevaluation: 12/16/2021  Provider: Gianluca Lara MD    CHIEF COMPLAINT       Chief Complaint   Patient presents with    Leg Pain     Left calf pain for one month. No injury. History of DVT. HISTORY OF PRESENT ILLNESS   (Location/Symptom, Timing/Onset,Context/Setting, Quality, Duration, Modifying Factors, Severity)  Note limiting factors. Magen Carrera is a 49 y.o. male  who  has a past medical history of Blood in stool, Diarrhea, DVT (deep venous thrombosis) ([address] Edema leg, Fatigue, and Hypertension. 42-year-old male who presents with left leg pain and swelling which has been worsening for the last 4 to 5 months. Patient has remote history of DVT in that same leg. Has chronic edema in both legs but states that it has been constant and worsening. Nothing seems to make his symptoms better. Worse with palpation. Pain is achy and does not radiate. It is mild/moderate in nature. Patient was on warfarin for approximately 8 months before he stopped it. Patient denies any other associated symptoms. Denies any fever, nausea, vomiting, diarrhea, chest pain, shortness of breath, dysuria, hematuria, back pain. Risk Factors 1 prior PE. NursingNotes were reviewed. REVIEW OF SYSTEMS    (2-9 systems for level 4, 10 or more for level 5)     Review of Systems   Constitutional: Negative. Negative for fatigue and fever. HENT: Negative for congestion and sore throat. Eyes: Negative. Respiratory: Negative for cough, chest tightness and shortness of breath. Cardiovascular: Positive for leg swelling. Negative for chest pain. Gastrointestinal: Negative for abdominal pain, diarrhea, nausea and vomiting. Genitourinary: Negative. Musculoskeletal: Negative. Skin: Negative.     Neurological: Negative for dizziness, light-headedness and headaches. All other systems reviewed and are negative. Except as noted above the remainder of the review of systems was reviewed and negative. PAST MEDICAL HISTORY     Past Medical History:   Diagnosis Date    Blood in stool     Diarrhea     DVT (deep venous thrombosis) (HCC)     Edema leg     bilateral    Fatigue     Hypertension          SURGICALHISTORY       Past Surgical History:   Procedure Laterality Date    COLONOSCOPY N/A 9/21/2021    COLONOSCOPY WITH BIOPSY performed by Sangita Vaughn MD at [address]  9/21/2021    COLONOSCOPY POLYPECTOMY ABLATION and cold snare performed by Sangita Vaughn MD at [facility]      right    UPPER GASTROINTESTINAL ENDOSCOPY N/A 9/21/2021    EGD BIOPSY performed by Sangita Vaughn MD at [facility]  9/21/2021    EGD ABLATION with apc performed by Sangita Vaughn MD at [facility]       Previous Medications    OMEPRAZOLE (PRILOSEC) 20 MG DELAYED RELEASE CAPSULE    Take 20 mg by mouth daily            Patient has no known allergies. FAMILY HISTORY     No family history on file.        SOCIAL HISTORY       Social History     Socioeconomic History    Marital status:      Spouse name: Not on file    Number of children: Not on file    Years of education: Not on file    Highest education level: Not on file   Occupational History    Not on file   Tobacco Use    Smoking status: Current Every Day Smoker     Packs/day: 2.00     Years: 25.00     Pack years: 50.00     Types: Cigarettes    Smokeless tobacco: Never Used   Vaping Use    Vaping Use: Never used   Substance and Sexual Activity    Alcohol use: Not Currently    Drug use: Never    Sexual activity: Not on file   Other Topics Concern    Not on file   Social History Narrative    Not on file     Social Determinants of Health     Financial Resource Strain:    [facility] Difficulty of Paying Living Expenses: Not on file   Food Insecurity:     Worried About Running Out of Food in the Last Year: Not on file    Ran Out of Food in the Last Year: Not on file   Transportation Needs:     Lack of Transportation (Medical): Not on file    Lack of Transportation (Non-Medical): Not on file   Physical Activity:     Days of Exercise per Week: Not on file    Minutes of Exercise per Session: Not on file   Stress:     Feeling of Stress : Not on file   Social Connections:     Frequency of Communication with Friends and Family: Not on file    Frequency of Social Gatherings with Friends and Family: Not on file    Attends Rastafari Services: Not on file    Active Member of [address] [place] [address] [employer] or Organizations: Not on file    Attends Club or Organization Meetings: Not on file    Marital Status: Not on file   Intimate Partner Violence:     Fear of Current or Ex-Partner: Not on file    Emotionally Abused: Not on file    Physically Abused: Not on file    Sexually Abused: Not on file   Housing Stability:     Unable to Pay for Housing in the Last Year: Not on file    Number of [place] in the Last Year: Not on file    Unstable Housing in the Last Year: Not on file       SCREENINGS    Nikolas Coma Scale  Eye Opening: Spontaneous  Best Verbal Response: Oriented  Best Motor Response: Obeys commands  Nikolas Coma Scale Score: 15        PHYSICAL EXAM    (up to 7 for level 4, 8 or more for level 5)     ED Triage Vitals [12/16/21 0722]   BP Temp Temp Source Pulse Resp SpO2 Height Weight   125/75 98.5 °F (36.9 °C) Oral 72 16 93 % -- (!) 339 lb 8.1 oz (154 kg)       Physical Exam  Vitals and nursing note reviewed. Constitutional:       General: He is not in acute distress. Appearance: Normal appearance. He is well-developed and normal weight. He is not ill-appearing, toxic-appearing or diaphoretic. HENT:      Head: Normocephalic and atraumatic.       Mouth/Throat:      Mouth: Mucous membranes are moist.      Pharynx: Oropharynx is clear. Eyes:      Extraocular Movements: Extraocular movements intact. Cardiovascular:      Rate and Rhythm: Normal rate and regular rhythm. Pulses: Normal pulses. Pulmonary:      Effort: Pulmonary effort is normal.      Breath sounds: Normal breath sounds. No decreased breath sounds. Abdominal:      Palpations: Abdomen is soft. Tenderness: There is no abdominal tenderness. Musculoskeletal:      Cervical back: Normal range of motion and neck supple. Right lower leg: Swelling present. No deformity, lacerations, tenderness or bony tenderness. 3+ Edema present. Left lower leg: Swelling and tenderness present. No deformity, lacerations or bony tenderness. 3+ Edema present. Comments: No palpable chord or superficial vein   Skin:     General: Skin is warm and dry. Capillary Refill: Capillary refill takes less than 2 seconds. Neurological:      General: No focal deficit present. Mental Status: He is alert and oriented to person, place, and time.    Psychiatric:         Mood and Affect: Mood normal.         Behavior: Behavior normal.         RESULTS     RADIOLOGY:   Non-plain filmimages such as CT, Ultrasound and MRI are read by the radiologist.     Interpretation per the Radiologist below, if available at the time ofthis note:    VL Extremity Venous Left               ED BEDSIDE ULTRASOUND:   Performed by ED Physician - none    LABS:  Labs Reviewed   CBC WITH AUTO DIFFERENTIAL - Abnormal; Notable for the following components:       Result Value    RDW 16.1 (*)     All other components within normal limits    Narrative:     Performed at:  [address]   Phone [phone]   COMPREHENSIVE METABOLIC PANEL - Abnormal; Notable for the following components:    Glucose 111 (*)     CREATININE 0.8 (*)     Albumin/Globulin Ratio 1.0 (*)     All other components within normal precautions given for any new or worsening symptoms. [SC]      ED Course User Index  [SC] Huan Conklin MD       The patient is at low risk for mortality based on demographic, history and clinical factors. Given the best available information and clinical assessment, I estimate the risk of hospitalization to be greater than risk of treatment at home. I have explained to the patient that the risk could rapidly change, given precautions for return and instructions. Explained to patient that the risk for mortality is low based on demographic, history and clinical factors. I discussed with patient the results of evaluation in the ED, diagnosis, care, and prognosis. The plan is to discharge to home. Patient is in agreement with plan and questions have been answered. I also discussed with patient the reasons which may require a return visit and the importance of follow-up care. The patient is well-appearing, nontoxic, and improved at the time of discharge. Patient agrees to call to arrange follow-up care as directed. Patient understands to return immediately for worsening/change in symptoms. CRITICAL CARE TIME   Total Critical Care time was 15 minutes, excluding separately reportable procedures. There was a high probability of clinically significant/life threatening deterioration in the patient's condition which required my urgent intervention. CONSULTS:  None    PROCEDURES:  Unless otherwise noted below, none     Procedures    FINAL IMPRESSION      1.  Superficial thrombosis of left lower extremity          DISPOSITION/PLAN   DISPOSITION Decision To Discharge 12/16/2021 10:12:48 AM      PATIENT REFERREDTO:  Willian Sorensen  [facility] [address]  [phone]    Schedule an appointment as soon as possible for a visit         DISCHARGEMEDICATIONS:  New Prescriptions    No medications on file          (Please note that portions of this note were completed with a voice recognition program.  Efforts were made to edit the dictations but occasionally words are mis-transcribed.)    Marcela Richards MD (electronically signed)  Attending Emergency Physician          Marcela Richards MD  12/16/21 1024

## 2021-12-16 NOTE — ED NOTES
Bed: E-41  Expected date:   Expected time:   Means of arrival:   Comments:  [address], RN  12/16/21 1002

## 2025-03-06 NOTE — PROGRESS NOTES
Orders:    Follow Up In Advanced Primary Care - PCP    CBC and Auto Differential; Future    Comprehensive Metabolic Panel; Future    Hemoglobin A1C; Future    Lipid Panel; Future    Prostate Specific Antigen; Future    Thyroid Stimulating Hormone; Future    T4, free; Future     PT WILL NEED COVID TEST DOS        C-Difficile admission screening and protocol:     * Admitted with diarrhea?y     *Prior history of C-Diff. In last 3 months?n     *Antibiotic use in the past 6-8 weeks?n     *Prior hospitalization or nursing home in the last month?n      SAFETY FIRST. .call before you fall  [address] time____730        Surgery time____________    Take the following medications with a sip of water:    Do not eat or drink anything after 12:00 midnight prior to your surgery. This includes water chewing gum, mints and ice chips. You may brush your teeth and gargle the morning of your surgery, but do not swallow the water     Please see your family doctor/pediatrician for a history and physical and/or concerning medications. Bring any test results/reports from your physicians office. If you are under the care of a heart doctor or specialist doctor, please be aware that you may be asked to them for clearance    You may be asked to stop blood thinners such as Coumadin, Plavix, Fragmin, Lovenox, etc., or any anti-inflammatories such as:  Aspirin, Ibuprofen, Advil, Naproxen prior to your surgery. We also ask that you stop any OTC medications such as fish oil, vitamin E, glucosamine, garlic, Multivitamins, COQ 10, etc.    We ask that you do not smoke 24 hours prior to surgery  We ask that you do not  drink any alcoholic beverages 24 hours prior to surgery     You must make arrangements for a responsible adult to take you home after your surgery. For your safety you will not be allowed to leave alone or drive yourself home. Your surgery will be cancelled if you do not have a ride home. Also for your safety, it is strongly suggested that someone stay with you the first 24 hours after your surgery. A parent or legal guardian must accompany a child scheduled for surgery and plan to stay at the hospital until the child is discharged.     Please do not bring other children with you. For your comfort, please wear simple loose fitting clothing to the hospital.  Please do not bring valuables. Do not wear any make-up or nail polish on your fingers or toes      For your safety, please do not wear any jewelry or body piercing's on the day of surgery. All jewelry must be removed. If you have dentures, they will be removed before going to operating room. For your convenience, we will provide you with a container. If you wear contact lenses or glasses, they will be removed, please bring a case for them. If you have a living will and a durable power of  for healthcare, please bring in a copy. As part of our patient safety program to minimize surgical site infections, we ask you to do the following:    · Please notify your surgeon if you develop any illness between         now and the  day of your surgery. · This includes a cough, cold, fever, sore throat, nausea,         or vomiting, and diarrhea, etc.  ·  Please notify your surgeon if you experience dizziness, shortness         of breath or blurred vision between now and the time of your surgery. Do not shave your operative site 96 hours prior to surgery. For face and neck surgery, men may use an electric razor 48 hours   prior to surgery. You may shower the night before surgery or the morning of   your surgery with an antibacterial soap. You will need to bring a photo ID and insurance card    [facility] has an onsite pharmacy, would you like to utilize our pharmacy     If you will be staying overnight and use a C-pap machine, please bring   your C-pap to hospital     Our goal is to provide you with excellent care, therefore, visitors will be limited to two(2) in the room at a time so that we may focus on providing this care for you. Please contact pre-admission testing if you have any further questions.                  [facility] phone number:  [phone] [facility] fax number:  [phone]  Please note these are generalized instructions for all surgical cases, you may be provided with more specific instructions according to your surgery. Preoperative Screening for Elective Surgery/Invasive Procedures While COVID-19 present in the community     Have you tested positive or have been told to self-isolate for COVID-19 like symptoms within the past 28 days?  Do you currently have any of the following symptoms? o Fever >100.0 F or 99.9 F in immunocompromised patients?n  o New onset cough, shortness of breath or difficulty breathing?n  o New onset sore throat, myalgia (muscle aches and pains), headache, loss of taste/smell or diarrhea?n   Have you had a potential exposure to COVID-19 within the past 14 days by:  o Close contact with a confirmed case?n  o Close contact with a healthcare worker,  or essential infrastructure worker (grocery store, [employer], gas station, public utilities or transportation)? y  o Do you reside in a congregate setting such as; skilled nursing facility, adult home, correctional facility, homeless shelter or other institutional setting?n  o Have you had recent travel to a known COVID-19 hotspot?n    Indicate if the patient has a positive screen by answering yes to one or more of the above questions. Patients who test positive or screen positive prior to surgery or on the day of surgery should be evaluated in conjunction with the surgeon/proceduralist/anesthesiologist to determine the urgency of the procedure.

## (undated) DEVICE — SINGLE-USE POLYPECTOMY SNARE: Brand: CAPTIFLEX

## (undated) DEVICE — ENDOSCOPY KIT: Brand: MEDLINE INDUSTRIES, INC.

## (undated) DEVICE — FIAPC® PROBE W/ FILTER 2200 A OD 2.3MM/6.9FR; L 2.2M/7.2FT: Brand: ERBE

## (undated) DEVICE — SNARES COLD OVAL 10MM THIN

## (undated) DEVICE — ELECTRODE PT RET AD L9FT HI MOIST COND ADH HYDRGEL CORDED

## (undated) DEVICE — FORCEPS BX 240CM 2.4MM L NDL RAD JAW 4 M00513334

## (undated) DEVICE — TRAP POLYP ETRAP

## (undated) DEVICE — CONTAINER SPEC 480ML CLR POLYSTYR 10% NEUT BUFF FRMLN ZN

## (undated) DEVICE — BITE BLOCK ENDOSCP AD 60 FR W/ ADJ STRP PLAS GRN BLOX